# Patient Record
Sex: FEMALE | Race: WHITE | NOT HISPANIC OR LATINO | Employment: FULL TIME | URBAN - METROPOLITAN AREA
[De-identification: names, ages, dates, MRNs, and addresses within clinical notes are randomized per-mention and may not be internally consistent; named-entity substitution may affect disease eponyms.]

---

## 2024-01-22 ENCOUNTER — THERAPY VISIT (OUTPATIENT)
Dept: PHYSICAL THERAPY | Facility: CLINIC | Age: 24
End: 2024-01-22
Payer: COMMERCIAL

## 2024-01-22 DIAGNOSIS — M54.50 BILATERAL LOW BACK PAIN WITHOUT SCIATICA, UNSPECIFIED CHRONICITY: Primary | ICD-10-CM

## 2024-01-22 PROCEDURE — 97161 PT EVAL LOW COMPLEX 20 MIN: CPT | Mod: GP | Performed by: PHYSICAL THERAPIST

## 2024-01-22 PROCEDURE — 97110 THERAPEUTIC EXERCISES: CPT | Mod: GP | Performed by: PHYSICAL THERAPIST

## 2024-01-22 NOTE — PROGRESS NOTES
"PHYSICAL THERAPY EVALUATION  Type of Visit: Evaluation    See electronic medical record for Abuse and Falls Screening details.    At 14 or 15 was in and out of hospitals a lot for mom, and slept poorly in a plastic chair, and \"messed her tailbone up\". Since then, she feels this pain when sitting. Standing, walking, and running is fine and no pain. Noticing this more and more recently. No numbness or tingling in the region. Flexed positions such as child's pose also do not relieve pain. Has tried warmth and tigerbalm in area. Starts in sacral region and moves up paraspinals. No numbness in saddle region, no loss of bowel and bladder. Average 4-5/10, worse 7/10. No recent imaging of low back in past few years.         Subjective       Presenting condition or subjective complaint: Back pain  Date of onset: 23    Relevant medical history:     Dates & types of surgery:      Prior diagnostic imaging/testing results:       Prior therapy history for the same diagnosis, illness or injury: Yes PT/massage therapy    Prior Level of Function  Transfers: Independent  Ambulation: Independent  ADL: Independent  IADL: Driving, Finances, Housekeeping, Laundry, Meal preparation, Work    Living Environment  Social support: With family members   Type of home: House   Stairs to enter the home: Yes 10 Is there a railing: Yes   Ramp: No   Stairs inside the home: No       Help at home: None  Equipment owned:       Employment: Yes Teacher  Hobbies/Interests:      Patient goals for therapy: Watch a movie or sit on a flight without pain    Pain assessment: Location: bilateral S1-S3 area referring up to mid thoracic back/Ratin-7/10     Objective   LUMBAR SPINE EVALUATION  PAIN: Pain is Exacerbated By: sitting, sleeping  Pain is Relieved By: heat, stretch, and use    POSTURE: Sitting Posture: Lordosis decreased  GAIT:   Weightbearing Status: WBAT  Assistive Device(s): None  Gait Deviations: WNL    ROM:   (Degrees) Left AROM  Right AROM "    Lumbar Side glide Nil loss Nil loss   Lumbar Flexion Palms to floor, no pain. Sitting flexion at 5 minutes causes 5/10 pain   Lumbar Extension Decreased pain with repetition, min loss with press up, unable to get hips to mat      Pain:   End feel:   PELVIC/SI SCREEN: Sacroiliac Provocation Test: - distraction, - sacral thrust   STRENGTH:  L hip ABD = 3+/5, R hip ABD = 3+/5, R hip EXT = 4/5, L hip EXT = 4/5    MYOTOMES: WNL  DTR S: WNL  CORD SIGNS: WNL  DERMATOMES: WNL    FLEXIBILITY:  Can palms floor  LUMBAR/HIP Special Tests:    Left Right   SHENA Negative  Negative    FADIR/Labrum/LONNY Negative  Negative    Femoral Nerve Negative  Negative    Manuel's Negative  Negative    Piriformis Negative  Negative    Quadrant Testing Negative  Negative    SLR Negative  Negative    Slump Negative  Negative    Stork with Extension Negative  Negative    Rober Negative  Negative       FUNCTIONAL TESTS: Double Leg Squat: Can easily go into yogi squat  SLS: dynamic valgus with single leg squat on bilateral sides  PALPATION:  No TTP along glute meds bilaterally, some lumbar paraspinal tenderness  SPINAL SEGMENTAL CONCLUSIONS:  Pain at L3-L5 bilaterally with spring testing      Assessment & Plan   CLINICAL IMPRESSIONS  Medical Diagnosis: low back pain    Treatment Diagnosis: Mechanical low back pain   Impression/Assessment: Patient is a 24 year old female with low back pain complaints.  The following significant findings have been identified: Pain, Decreased ROM/flexibility, Decreased joint mobility, Decreased strength, Impaired balance, and Decreased proprioception. These impairments interfere with their ability to perform self care tasks, work tasks, recreational activities, household chores, household mobility, and community mobility as compared to previous level of function.     Clinical Decision Making (Complexity):  Clinical Presentation: Stable/Uncomplicated  Clinical Presentation Rationale: based on medical and personal  factors listed in PT evaluation  Clinical Decision Making (Complexity): Low complexity    PLAN OF CARE  Treatment Interventions:  Modalities: Cryotherapy, Dry Needling  Interventions: Gait Training, Manual Therapy, Neuromuscular Re-education, Therapeutic Activity, Therapeutic Exercise, Self-Care/Home Management    Long Term Goals     PT Goal 1  Goal Identifier: Sitting  Goal Description: Patient will sit for 10 minutes with 1/10 pain  Rationale: to maximize safety and independence within the home;to maximize safety and independence with self cares  Target Date: 04/15/24      Frequency of Treatment: 1x per week  Duration of Treatment: 6 weeks    Recommended Referrals to Other Professionals:  potentially sports and ortho  Education Assessment:   Learner/Method: Patient;No Barriers to Learning    Risks and benefits of evaluation/treatment have been explained.   Patient/Family/caregiver agrees with Plan of Care.     Evaluation Time:     PT Eval, Low Complexity Minutes (50237): 20     Signing Clinician: Nelly Bernal PT

## 2024-02-03 ENCOUNTER — HEALTH MAINTENANCE LETTER (OUTPATIENT)
Age: 24
End: 2024-02-03

## 2024-03-29 PROBLEM — M54.50 BILATERAL LOW BACK PAIN WITHOUT SCIATICA, UNSPECIFIED CHRONICITY: Status: RESOLVED | Noted: 2024-01-22 | Resolved: 2024-03-29

## 2024-03-29 NOTE — PROGRESS NOTES
Discharge Note    Progress reporting period is from initial evaluation date (please see noted date below) to Jan 22, 2024.  Linked Episodes   Type: Episode: Status: Noted: Resolved: Last update: Updated by:   PHYSICAL THERAPY low back pain 1/22/2024 Active 1/22/2024 1/22/2024  4:46 PM Nelly Bernal, PT      Comments:       Tiffany has not returned for physical therapy follow up and current status is unknown.  Please see information below for last relevant information on current status.  Patient seen for   visits.    SUBJECTIVE  Subjective changes noted by patient:     .  Current pain level is  .     Previous pain level was   .   Changes in function:  Yes (See Goal flowsheet attached for changes in current functional level)  Adverse reaction to treatment or activity: None    OBJECTIVE  Changes noted in objective findings:       ASSESSMENT/PLAN      Updated problem list and treatment plan:   Pain - HEP  Decreased ROM/flexibility - HEP  Decreased function - HEP  Decreased strength - HEP  STG/LTGs have been met or progress has been made towards goals:  Yes, please see goal flowsheet for most current information  Assessment of Progress: current status is unknown.    Last current status:     Self Management Plans:  HEP  I have re-evaluated this patient and find that the nature, scope, duration and intensity of the therapy is appropriate for the medical condition of the patient.  Rober continues to require the following intervention to meet STG and LTG's:  HEP.    Recommendations:  Discharge with current home program.  Patient to follow up with MD as needed.    Please refer to the daily flowsheet for treatment today, total treatment time and time spent performing 1:1 timed codes.

## 2025-03-02 ENCOUNTER — HEALTH MAINTENANCE LETTER (OUTPATIENT)
Age: 25
End: 2025-03-02